# Patient Record
Sex: FEMALE | URBAN - METROPOLITAN AREA
[De-identification: names, ages, dates, MRNs, and addresses within clinical notes are randomized per-mention and may not be internally consistent; named-entity substitution may affect disease eponyms.]

---

## 2021-11-05 ENCOUNTER — APPOINTMENT (OUTPATIENT)
Dept: URBAN - METROPOLITAN AREA SURGERY 9 | Age: 71
Setting detail: DERMATOLOGY
End: 2021-11-05

## 2021-11-05 DIAGNOSIS — Z85.828 PERSONAL HISTORY OF OTHER MALIGNANT NEOPLASM OF SKIN: ICD-10-CM

## 2021-11-05 DIAGNOSIS — L57.0 ACTINIC KERATOSIS: ICD-10-CM

## 2021-11-05 DIAGNOSIS — L56.5 DISSEMINATED SUPERFICIAL ACTINIC POROKERATOSIS (DSAP): ICD-10-CM

## 2021-11-05 PROCEDURE — OTHER COUNSELING: OTHER

## 2021-11-05 PROCEDURE — 99204 OFFICE O/P NEW MOD 45 MIN: CPT | Mod: 25

## 2021-11-05 PROCEDURE — OTHER OBSERVATION: OTHER

## 2021-11-05 PROCEDURE — OTHER LIQUID NITROGEN: OTHER

## 2021-11-05 PROCEDURE — 17000 DESTRUCT PREMALG LESION: CPT

## 2021-11-05 PROCEDURE — OTHER PRESCRIPTION: OTHER

## 2021-11-05 ASSESSMENT — LOCATION SIMPLE DESCRIPTION DERM
LOCATION SIMPLE: NOSE
LOCATION SIMPLE: RIGHT PRETIBIAL REGION
LOCATION SIMPLE: LEFT PRETIBIAL REGION

## 2021-11-05 ASSESSMENT — LOCATION DETAILED DESCRIPTION DERM
LOCATION DETAILED: LEFT DISTAL PRETIBIAL REGION
LOCATION DETAILED: NASAL DORSUM
LOCATION DETAILED: RIGHT DISTAL PRETIBIAL REGION

## 2021-11-05 ASSESSMENT — LOCATION ZONE DERM
LOCATION ZONE: NOSE
LOCATION ZONE: LEG

## 2021-11-05 NOTE — PROCEDURE: LIQUID NITROGEN
Detail Level: Simple
Number Of Freeze-Thaw Cycles: 1 freeze-thaw cycle
Render Post-Care Instructions In Note?: no
Duration Of Freeze Thaw-Cycle (Seconds): 5
Post-Care Instructions: I reviewed with the patient in detail post-care instructions. Patient is to wear sunprotection, and avoid picking at any of the treated lesions. Pt may apply Vaseline to crusted or scabbing areas.
Consent: The patient's consent was obtained including but not limited to risks of crusting, scabbing, blistering, scarring, darker or lighter pigmentary change, recurrence, incomplete removal and infection.
Show Aperture Variable?: Yes